# Patient Record
Sex: FEMALE | Race: WHITE
[De-identification: names, ages, dates, MRNs, and addresses within clinical notes are randomized per-mention and may not be internally consistent; named-entity substitution may affect disease eponyms.]

---

## 2018-01-31 NOTE — OR
DATE OF OPERATION:  01/30/2018

 

SURGEON:  Herb Rodriguez MD

 

PREOPERATIVE DIAGNOSIS:  Cataract left eye.

 

POSTOPERATIVE DIAGNOSIS:  Cataract left eye.

 

OPERATION PERFORMED:  Phacoemulsification of cataract left eye with placement of

an Abbott model, ZCB00, 22.5 diopter, foldable, posterior chamber intraocular

lens.

 

ASSISTANT:  None.

 

DESCRIPTION OF PROCEDURE:  Peribulbar anesthetic was performed using a mixture

of 2% lidocaine with Wydase.  The patient was prepped and draped in the usual

fashion.  A 3 mm fornix based conjunctival flap was performed at the 12 o'clock

position.  Hemostasis was obtained using diathermy, and a 2.8 mm grooved near

clear corneal incision was then made.  A stab incision was made into the

anterior chamber at the 3 o'clock position and a second stab wound incision was

made underlying the grooved near clear corneal incision.  Viscoat was instilled

into the anterior chamber, and a continuous tear capsulotomy was performed.

Hydrodissection was accomplished with balanced salt solution, and the nucleus

was removed in a divide and conquer fashion.  The remaining cortical material

was removed with the irrigation and aspiration unit.  Viscoat was instilled into

the anterior chamber, and an Abbott model, ZCB00, 22.5 diopter, foldable,

posterior chamber intraocular lens was placed into the capsular bag, the haptics

being positioned at the 3 and 9 o'clock positions.  The residual Viscoat was

removed from the anterior chamber and the anterior chamber reformed with

balanced salt solution.  The wound was checked and noted to be watertight.  The

conjunctiva was secured in its original position with diathermy.  Alphagan and

Maxitrol Ointment were then placed into the patient's eye.  The patient

tolerated the procedure well and it was without complication.  Elapsed

phacoemulsification time was 20.7 seconds.

 

Postoperative instructions as related to activities as well as medications were

reviewed with the patient.  The patient was instructed to return to see me on

the day following surgery for the first postoperative check.  The patient was

also instructed to contact me prior to that time if she were to have any

problems.

 

 

Job#: [id number]

DD: 01/30/2018 1015

DT: 01/30/2018 1614 DEG/MODL

CC:  PEGGY Romero RNC, ANP

## 2018-02-28 NOTE — OR
DATE OF OPERATION:  02/27/2018

 

SURGEON:  Herb Rodriguez MD

 

PREOPERATIVE DIAGNOSIS:  Cataract right eye.

 

POSTOPERATIVE DIAGNOSIS:  Same.

 

OPERATION PERFORMED:  Phacoemulsification of cataract right eye with placement

of an Abbott, model ZCB00, 22.5 diopter, foldable, posterior chamber intraocular

lens.

 

ASSISTANT:  None.

 

DESCRIPTION OF PROCEDURE:  Peribulbar anesthetic was performed using a mixture

of 2% lidocaine with Wydase.  The patient was prepped and draped in the usual

fashion.  A 3 mm fornix based conjunctival flap was performed at the 10 o'clock

position.  Hemostasis was obtained using diathermy, and a 2.8 mm grooved near

clear corneal incision was then made.  A stab incision was made into the

anterior chamber at the 12 o'clock position and a second stab wound incision was

made underlying the grooved near clear corneal incision.  Viscoat was instilled

into the anterior chamber, and a continuous tear capsulotomy was performed.

Hydrodissection was accomplished with balanced salt solution, and the nucleus

was removed in a divide and conquer fashion.  The remaining cortical material

was removed with the irrigation and aspiration unit.  Viscoat was instilled into

the anterior chamber, and an Abbott, model ZCB00, 22.5 diopter, foldable,

posterior chamber intraocular lens was placed into the capsular bag, the haptics

being positioned at the 1 and 7 o'clock positions.  The residual Viscoat was

removed from the anterior chamber and the anterior chamber reformed with

balanced salt solution.  The wound was checked and noted to be watertight.  The

conjunctiva was secured in its original position with diathermy.  Alphagan and

Maxitrol Ointment were then placed into the patient's eye.  The patient

tolerated the procedure well and it was without complication.  Elapsed

phacoemulsification time was 20.7 seconds.

 

Postoperative instructions as related to activities as well as medications were

reviewed with the patient.  The patient was instructed to return to see me on

the day following surgery for the first postoperative check.  The patient was

also instructed to contact me prior to that time if she were to have any

problems.

 

Job#: [id number]

DD: 02/27/2018 1043

DT: 02/27/2018 1219 DEG/MODL



CC:  PEGGY DOWNS FNP MTDD

## 2019-04-04 ENCOUNTER — HOSPITAL ENCOUNTER (OUTPATIENT)
Dept: HOSPITAL 7 - FB.ED | Age: 74
Setting detail: OBSERVATION
LOS: 1 days | Discharge: HOME | End: 2019-04-05
Attending: FAMILY MEDICINE | Admitting: FAMILY MEDICINE
Payer: MEDICARE

## 2019-04-04 DIAGNOSIS — F32.9: ICD-10-CM

## 2019-04-04 DIAGNOSIS — Z79.4: ICD-10-CM

## 2019-04-04 DIAGNOSIS — I10: ICD-10-CM

## 2019-04-04 DIAGNOSIS — Z79.899: ICD-10-CM

## 2019-04-04 DIAGNOSIS — R29.810: Primary | ICD-10-CM

## 2019-04-04 DIAGNOSIS — H91.90: ICD-10-CM

## 2019-04-04 DIAGNOSIS — E11.9: ICD-10-CM

## 2019-04-04 DIAGNOSIS — E78.00: ICD-10-CM

## 2019-04-04 PROCEDURE — 85025 COMPLETE CBC W/AUTO DIFF WBC: CPT

## 2019-04-04 PROCEDURE — 99285 EMERGENCY DEPT VISIT HI MDM: CPT

## 2019-04-04 PROCEDURE — 85610 PROTHROMBIN TIME: CPT

## 2019-04-04 PROCEDURE — 70548 MR ANGIOGRAPHY NECK W/DYE: CPT

## 2019-04-04 PROCEDURE — 84484 ASSAY OF TROPONIN QUANT: CPT

## 2019-04-04 PROCEDURE — 96374 THER/PROPH/DIAG INJ IV PUSH: CPT

## 2019-04-04 PROCEDURE — 80061 LIPID PANEL: CPT

## 2019-04-04 PROCEDURE — 70551 MRI BRAIN STEM W/O DYE: CPT

## 2019-04-04 PROCEDURE — 82962 GLUCOSE BLOOD TEST: CPT

## 2019-04-04 PROCEDURE — G0378 HOSPITAL OBSERVATION PER HR: HCPCS

## 2019-04-04 PROCEDURE — 36415 COLL VENOUS BLD VENIPUNCTURE: CPT

## 2019-04-04 PROCEDURE — 93005 ELECTROCARDIOGRAM TRACING: CPT

## 2019-04-04 PROCEDURE — 70450 CT HEAD/BRAIN W/O DYE: CPT

## 2019-04-04 PROCEDURE — A9579 GAD-BASE MR CONTRAST NOS,1ML: HCPCS

## 2019-04-04 PROCEDURE — 80048 BASIC METABOLIC PNL TOTAL CA: CPT

## 2019-04-04 NOTE — EDM.PDOC
ED HPI GENERAL MEDICAL PROBLEM





- General


Stated Complaint: BLURRED VISION


Time Seen by Provider: 04/04/19 15:12


Source of Information: Reports: Patient, EMS, Family


History Limitations: Reports: No Limitations





- History of Present Illness


INITIAL COMMENTS - FREE TEXT/NARRATIVE: 





73 y.o.w.f with a H/O IDDM and hearing issues for 60 years, was serving food 

when she suddenly felt numbness at her right face, blurred vision and  slurred 

speech. All those symptoms subsided PTA. No N/V/D, no dizziness, no focal 

weakness. No other acute medical issues. / 85 Pulse 71 RR 17 Pulse ox 97% 

on RSA Temp 36.7


Onset: Today


Onset Date: 04/04/19


Onset Time: 02:30


Duration: Hour(s):


Location: Reports: Head


Severity: Mild


Improves with: Reports: Rest


Worsens with: Reports: None


Context: Reports: Other


Associated Symptoms: Reports: Other (blurred vision, word finding difficulties, 

right facial numbness)





- Related Data


 Allergies











Allergy/AdvReac Type Severity Reaction Status Date / Time


 


No Known Allergies Allergy   Verified 04/07/19 02:26











Home Meds: 


 Home Meds





FLUoxetine [PROzac] 10 mg PO DAILY PRN 01/29/18 [History]


Insulin Glarg,Human.Rec.Analog [Lantus Solostar] 35 unit SUBCUT BEDTIME 01/29/ 18 [History]


Insulin Lispro Prot/Lispro [HumaLOG Mix 75-25] 12 unit SUBCUT 0800 01/29/18 [

History]


Insulin Lispro Protamin/Lispro [Humalog Mix 75-25 Kwikpen] 8 units SUBCUT 1700 

01/29/18 [History]


L.acidoph,Paracasei, B.lactis [Probiotic] 1 each PO DAILY 01/29/18 [History]


Multivitamin [Multivitamins] 1 each PO DAILY 01/29/18 [History]


.Megachel 1 tab PO DAILY 04/04/19 [History]


Cholecalciferol (Vitamin D3) [Vitamin D] 5,000 unit PO DAILY 04/04/19 [History]


Rosuvastatin [Crestor] 10 mg PO DAILY #30 tab 04/05/19 [Rx]


hydroCHLOROthiazide [Hydrochlorothiazide] 25 mg PO DAILY #30 tablet 04/05/19 [Rx

]


cloNIDine [Catapres] 0.1 mg PO DAILY PRN #3 tab 04/06/19 [Rx]











Past Medical History


HEENT History: Reports: Cataract, Hard of Hearing, Impaired Vision


Cardiovascular History: Reports: High Cholesterol, Hypertension


Respiratory History: Reports: None


Gastrointestinal History: Reports: GERD


Genitourinary History: Reports: UTI, Recurrent


OB/GYN History: Reports: Pregnancy


Musculoskeletal History: Reports: Arthritis, Back Pain, Chronic, Fracture


Other Musculoskeletal History: FRACTURED LEFT UPPER ARM,  ARTHRITIS TO BOTH 

HANDS.  LEFT HAND TRIGGER FINGER.


Neurological History: Reports: None


Psychiatric History: Reports: Depression


Endocrine/Metabolic History: Reports: Diabetes, Type II


Hematologic History: Reports: None


Immunologic History: Reports: None


Oncologic (Cancer) History: Reports: None


Dermatologic History: Reports: None





- Infectious Disease History


Infectious Disease History: Reports: Chicken Pox, Measles, Mumps





- Past Surgical History


Head Surgeries/Procedures: Reports: None


HEENT Surgical History: Reports: Cataract Surgery, Tonsillectomy, Other (See 

Below)


Other HEENT Surgeries/Procedures: EAR SURGERY


Cardiovascular Surgical History: Reports: None


Respiratory Surgical History: Reports: None


GI Surgical History: Reports: Appendectomy, Cholecystectomy


Female  Surgical History: Reports: Cystoscopy, D&C, Other (See Below)


Other Female  Surgeries/Procedures: PROLAPSED UTERUS


Endocrine Surgical History: Reports: None


Neurological Surgical History: Reports: None


Musculoskeletal Surgical History: Reports: None


Oncologic Surgical History: Reports: None


Dermatological Surgical History: Reports: None





Social & Family History





- Family History


Family Medical History: Noncontributory





- Caffeine Use


Caffeine Use: Reports: Coffee


Other Caffeine Use: 2 CUPS A DAY





ED ROS GENERAL





- Review of Systems


Review Of Systems: See Below


Constitutional: Reports: No Symptoms


HEENT: Reports: No Symptoms


Respiratory: Reports: No Symptoms


Cardiovascular: Reports: No Symptoms


Endocrine: Reports: No Symptoms


GI/Abdominal: Reports: No Symptoms


: Reports: No Symptoms


Musculoskeletal: Reports: No Symptoms


Skin: Reports: No Symptoms


Neurological: Reports: Paresthesia (subsided PTA), Change in Speech (Subsided 

PTA)


Psychiatric: Reports: No Symptoms


Hematologic/Lymphatic: Reports: No Symptoms


Immunologic: Reports: No Symptoms





ED EXAM, NEURO





- Physical Exam


Exam: See Below


Exam Limited By: No Limitations


General Appearance: Alert, WD/WN, Mild Distress, Thin


Eye Exam: Bilateral Eye: Nystagmus (bilateralt, chronic, not new)


Ears: Normal External Exam, Other (wear hearing aids for 60 years. )


Nose: Normal Inspection, Normal Mucosa, No Blood


Throat/Mouth: Normal Inspection, Normal Lips, Normal Voice, No Airway Compromise


Head Exam: Atraumatic, Normocephalic


Neck: Normal Inspection, Supple, Non-Tender, Full Range of Motion


Respiratory/Chest: No Respiratory Distress, Lungs Clear, Normal Breath Sounds, 

Chest Non-Tender


Cardiovascular: Normal Peripheral Pulses, Regular Rate, Rhythm, No Edema, No 

Gallop, No JVD, No Murmur, No Rub


GI/Abdominal: Normal Bowel Sounds, Soft, Non-Tender, No Organomegaly, No 

Distention, No Abnormal Bruit, No Mass, Pelvis Stable


 (Female) Exam: Deferred


Rectal (Female) Exam: Deferred


Neurological: Alert, Normal Mood/Affect, Normal Dorsiflexion, CN II-XII Intact


Back Exam: Normal Inspection, Full Range of Motion


Extremities: Normal Inspection, Normal Range of Motion, Non-Tender, No Pedal 

Edema


Psychiatric: Normal Affect, Normal Mood


Skin Exam: Warm, Dry, Intact, Normal Color, No Rash





Course





- Vital Signs


Text/Narrative:: 


73 y.o.w.f with a H/O IDDM and hearing issues for 60 years, was serving food 

when she suddenly felt numbness at her right face, blurred vision and  slurred 

speech. All those symptoms subsided PTA. No N/V/D, no dizziness, no focal 

weakness. No other acute medical issues. Last BP was taken 1 year ago. / 

85 Pulse 71 RR 17 Pulse ox 97% on RSA Temp 36.7


PE: WNWD W F with IDDM, with elevated BP 


Imaging: CT head: NAD as per RAD


Labs: CBC, BMP WNL except GLC was 233


Impression: Hypertensive encephalopathy, H/O IDDM, 


Tx: labetolol


Reexam: SBP improved to 130


4.35 pm Consultation: Dr. Kuhn, Neurologist, [facility]: Hypertensive 

encephalopathy, because the symptoms do not fit to CVA/TIA vascular  

distribution; recommended admit for obs with BP control


4.55 Consultation: Dr. Sebastian, Hospitalist: ?


Plan: Admit for observation


 


Last Recorded V/S: 


 Last Vital Signs











Temp  36.7 C   04/05/19 08:23


 


Pulse  75   04/05/19 15:20


 


Resp  18   04/05/19 15:20


 


BP  130/68   04/05/19 15:20


 


Pulse Ox  95   04/05/19 15:20














- Orders/Labs/Meds


Labs: 


 Laboratory Tests











  04/04/19 04/04/19 04/04/19 Range/Units





  15:25 15:25 15:25 


 


WBC  6.0    (4.5-12.0)  X10-3/uL


 


RBC  4.57    (3.23-5.20)  x10(6)uL


 


Hgb  14.9    (11.5-15.5)  g/dL


 


Hct  44.0    (30.0-51.3)  %


 


MCV  96.4 H    (80-96)  fL


 


MCH  32.6    (27.7-33.6)  pg


 


MCHC  33.8    (32.2-35.4)  g/dL


 


RDW  12.4    (11.5-15.5)  %


 


Plt Count  319    (125-369)  X10(3)uL


 


MPV  8.0    (7.4-10.4)  fL


 


Neut % (Auto)  42.0 L    (46-82)  %


 


Lymph % (Auto)  48.1 H    (13-37)  %


 


Mono % (Auto)  6.7    (4-12)  %


 


Eos % (Auto)  3    (1.0-5.0)  %


 


Baso % (Auto)  1    (0-2)  %


 


Neut # (Auto)  2.5    (1.6-8.3)  #


 


Lymph # (Auto)  2.9    (0.6-5.0)  #


 


Mono # (Auto)  0.4    (0.0-1.3)  #


 


Eos # (Auto)  0.2    (0.0-0.8)  #


 


Baso # (Auto)  0.0    (0.0-0.2)  #


 


PT   10.3   (8.7-11.1)  


 


INR   1.06   (0.89-1.13)  


 


Sodium    140  (135-145)  mmol/L


 


Potassium    3.6  (3.5-5.3)  mmol/L


 


Chloride    103  (100-110)  mmol/L


 


Carbon Dioxide    25  (21-32)  mmol/L


 


BUN    17  (7-18)  mg/dL


 


Creatinine    0.9  (0.55-1.02)  mg/dL


 


Est Cr Clr Drug Dosing    TNP  


 


Estimated GFR (MDRD)    > 60  (>60)  


 


BUN/Creatinine Ratio    18.9  (9-20)  


 


Glucose    233 H  ()  mg/dL


 


POC Glucose     ()  mg/dL


 


Calcium    8.7  (8.6-10.2)  mg/dL


 


Troponin I     (<0.017-0.056)  ng/mL














  04/04/19 04/04/19 Range/Units





  15:25 15:43 


 


WBC    (4.5-12.0)  X10-3/uL


 


RBC    (3.23-5.20)  x10(6)uL


 


Hgb    (11.5-15.5)  g/dL


 


Hct    (30.0-51.3)  %


 


MCV    (80-96)  fL


 


MCH    (27.7-33.6)  pg


 


MCHC    (32.2-35.4)  g/dL


 


RDW    (11.5-15.5)  %


 


Plt Count    (125-369)  X10(3)uL


 


MPV    (7.4-10.4)  fL


 


Neut % (Auto)    (46-82)  %


 


Lymph % (Auto)    (13-37)  %


 


Mono % (Auto)    (4-12)  %


 


Eos % (Auto)    (1.0-5.0)  %


 


Baso % (Auto)    (0-2)  %


 


Neut # (Auto)    (1.6-8.3)  #


 


Lymph # (Auto)    (0.6-5.0)  #


 


Mono # (Auto)    (0.0-1.3)  #


 


Eos # (Auto)    (0.0-0.8)  #


 


Baso # (Auto)    (0.0-0.2)  #


 


PT    (8.7-11.1)  


 


INR    (0.89-1.13)  


 


Sodium    (135-145)  mmol/L


 


Potassium    (3.5-5.3)  mmol/L


 


Chloride    (100-110)  mmol/L


 


Carbon Dioxide    (21-32)  mmol/L


 


BUN    (7-18)  mg/dL


 


Creatinine    (0.55-1.02)  mg/dL


 


Est Cr Clr Drug Dosing    


 


Estimated GFR (MDRD)    (>60)  


 


BUN/Creatinine Ratio    (9-20)  


 


Glucose    ()  mg/dL


 


POC Glucose   205 H  ()  mg/dL


 


Calcium    (8.6-10.2)  mg/dL


 


Troponin I  0.018   (<0.017-0.056)  ng/mL











Meds: 


Medications














Discontinued Medications














Generic Name Dose Route Start Last Admin





  Trade Name Freq  PRN Reason Stop Dose Admin


 


Gadoteridol  10 ml  04/05/19 14:53  04/05/19 15:22





  Prohance  IVPUSH  04/05/19 14:54  10 ml





  ONETIME ONE   Administration





     





     





     





     


 


Labetalol HCl  20 mg  04/04/19 16:56  04/04/19 17:16





  Normodyne  IVPUSH  04/04/19 16:57  20 mg





  ONETIME STA   Administration





     





     





  Protocol   





     


 


Sodium Chloride  10 ml  04/04/19 17:15  04/04/19 16:50





  Saline Flush  FLUSH   10 ml





  ASDIRECTED PRN   Administration





  Keep Vein Open   





     





     





     














Departure





- Departure


Time of Disposition: 19:00


Disposition: Refer to Observation


Condition: Fair


Clinical Impression: 


 Hypertensive encephalopathy








- Discharge Information

## 2019-04-05 NOTE — CT
INDICATION:  Word-finding difficulties, dizzy, blurred vision, frontal headache.



CT HEAD WITHOUT CONTRAST:  Spiral 3.75 mm helical images with sagittal and 
coronal reconstructions were obtained 04/04/19 - no comparisons.  Total exam 
DLP = 1,244.99 mGy-cm.



The paranasal sinuses appear to be fairly well-aerated.  There appears to be 
virtually no aeration of mastoid air cells, which appears to be a developmental 
process, rather than pathologic.  



Mild degenerative changes are noted at the atlantoodontoid joint.  



No definite cranial abnormality was seen.  



Vertebral artery and internal carotid artery calcification is noted.



In the white matter, there is some questionable punctate decreased density, 
especially in the right frontal area, which could represent very minimal 
microvascular disease type change.



No definite shift of midline structures or ventricular abnormalities were 
identified.  



No acute bleeding site or hematoma was seen.  



Calcification is noted at the basal ganglia on the left, likely not of clinical 
significance.  



IMPRESSION:

1.  No acute intracranial abnormality.

2.  Question minimal microvascular disease.

3.  Cerebrovascular disease with arterial calcifications noted.  



Report was called to Dr. Metz at 1604 hours on 04/04/19.

[facility]RY

## 2019-04-05 NOTE — PCM.HP
H&P History of Present Illness





- General


Date of Service: 19


Admit Problem/Dx: 


 Admission Diagnosis/Problem





Admission Diagnosis/Problem      Hypertensive encephalopathy








Source of Information: Patient


History Limitations: Reports: No Limitations





- History of Present Illness


Initial Comments - Free Text/Narative: 


Sobeida came in yesterday with blurred vision, 'drooping of the face' (which 

lasted 5 min)and was admitted for possible encephalopathy. She has no 

complaints this morning. She was serving food when she felt numb ,at the right 

side of the face, and double vision. She denied headache, chest pain or 

shortness of breath. She's healthy with exception of hearing loss and type 2 

diabetes





- Related Data


Allergies/Adverse Reactions: 


 Allergies











Allergy/AdvReac Type Severity Reaction Status Date / Time


 


No Known Allergies Allergy   Verified 18 09:12











Home Medications: 


 Home Meds





FLUoxetine [PROzac] 10 mg PO DAILY PRN 18 [History]


Insulin Glarg,Human.Rec.Analog [Lantus Solostar] 35 unit SUBCUT BEDTIME  [History]


Insulin Lispro Prot/Lispro [HumaLOG Mix 75-25] 12 unit SUBCUT 0800 18 [

History]


Insulin Lispro Protamin/Lispro [Humalog Mix 75-25 Kwikpen] 8 units SUBCUT 1700 

18 [History]


L.acidoph,Paracasei, B.lactis [Probiotic] 1 each PO DAILY 18 [History]


Multivitamin [Multivitamins] 1 each PO DAILY 18 [History]


.Megachel 1 tab DAILY 19 [History]


Cholecalciferol (Vitamin D3) [Vitamin D] 5,000 unit PO DAILY 19 [History]


Rosuvastatin [Crestor] 10 mg PO DAILY #30 tab 19 [Rx]


hydroCHLOROthiazide [Hydrochlorothiazide] 25 mg PO DAILY #30 tablet 19 [Rx

]











Past Medical History


HEENT History: Reports: Cataract, Hard of Hearing, Impaired Vision


Cardiovascular History: Reports: High Cholesterol, Hypertension


Respiratory History: Reports: None


Gastrointestinal History: Reports: GERD


Genitourinary History: Reports: UTI, Recurrent


OB/GYN History: Reports: Pregnancy


Other OB/BYN History: 


Musculoskeletal History: Reports: Arthritis, Back Pain, Chronic, Fracture


Other Musculoskeletal History: FRACTURED LEFT UPPER ARM,  ARTHRITIS TO BOTH 

HANDS.  LEFT HAND TRIGGER FINGER.


Neurological History: Reports: None


Psychiatric History: Reports: Depression


Endocrine/Metabolic History: Reports: Diabetes, Type II


Hematologic History: Reports: None


Immunologic History: Reports: None


Oncologic (Cancer) History: Reports: None


Dermatologic History: Reports: None





- Infectious Disease History


Infectious Disease History: Reports: Chicken Pox, Measles, Mumps





- Past Surgical History


Head Surgeries/Procedures: Reports: None


HEENT Surgical History: Reports: Cataract Surgery, Tonsillectomy, Other (See 

Below)


Other HEENT Surgeries/Procedures: EAR SURGERY


Cardiovascular Surgical History: Reports: None


Respiratory Surgical History: Reports: None


GI Surgical History: Reports: Appendectomy, Cholecystectomy


Female  Surgical History: Reports: Cystoscopy, D&C, Other (See Below)


Other Female  Surgeries/Procedures: PROLAPSED UTERUS


Endocrine Surgical History: Reports: None


Neurological Surgical History: Reports: None


Musculoskeletal Surgical History: Reports: None


Oncologic Surgical History: Reports: None


Dermatological Surgical History: Reports: None





Social & Family History





- Family History


Family Medical History: Noncontributory





- Tobacco Use


Smoking Status *Q: Never Smoker





- Caffeine Use


Caffeine Use: Reports: Coffee


Other Caffeine Use: 2 CUPS A DAY





- Recreational Drug Use


Recreational Drug Use: No





H&P Review of Systems





- Review of Systems:


Review Of Systems: ROS reveals no pertinent complaints other than HPI.





Exam





- Exam


Exam: See Below





- Vital Signs


Vital Signs: 


 Last Vital Signs











Temp  98.1 F   19 08:23


 


Pulse  81   19 08:23


 


Resp  15   19 08:23


 


BP  119/62   19 08:23


 


Pulse Ox  91 L  19 08:23











Weight: 64.728 kg





- Exam


General: Alert, Oriented, 4


HEENT: PERRLA, Hearing Intact, Mucosa Moist & Pink, Nares Patent, Normal Nasal 

Septum, Posterior Pharynx Clear, Conjunctiva Clear, EOMI, EACs Clear, TMs Clear


Neck: Supple, Trachea Midline, 2


Lungs: Clear to Auscultation, Normal Respiratory Effort


Cardiovascular: Regular Rate, Regular Rhythm


GI/Abdominal Exam: Normal Bowel Sounds, Soft, Non-Tender, No Organomegaly, No 

Distention, No Abnormal Bruit, No Mass, Pelvis Stable


 (Female) Exam: Deferred


Rectal (Female) Exam: Deferred


Back Exam: Normal Inspection, Full Range of Motion, NT


Extremities: Normal Inspection, Normal Range of Motion, Non-Tender, No Pedal 

Edema, Normal Capillary Refill


Skin: Warm, Dry, Intact


Neurological: Cranial Nerves Intact, Reflexes Equal Bilateral


Neuro Extensive - Mental Status: Alert, Oriented x3, Normal Mood/Affect, Normal 

Cognition


Neuro Extensive - Motor, Sensory, Reflexes: CN II-XII Intact, Normal Gait, 

Normal Reflexes


Psychiatric: Alert, Normal Affect, Normal Mood





- Patient Data


Lab Results Last 24 hrs: 


 Laboratory Results - last 24 hr











  19 Range/Units





  15:25 15:25 15:25 


 


WBC  6.0    (4.5-12.0)  X10-3/uL


 


RBC  4.57    (3.23-5.20)  x10(6)uL


 


Hgb  14.9    (11.5-15.5)  g/dL


 


Hct  44.0    (30.0-51.3)  %


 


MCV  96.4 H    (80-96)  fL


 


MCH  32.6    (27.7-33.6)  pg


 


MCHC  33.8    (32.2-35.4)  g/dL


 


RDW  12.4    (11.5-15.5)  %


 


Plt Count  319    (125-369)  X10(3)uL


 


MPV  8.0    (7.4-10.4)  fL


 


Neut % (Auto)  42.0 L    (46-82)  %


 


Lymph % (Auto)  48.1 H    (13-37)  %


 


Mono % (Auto)  6.7    (4-12)  %


 


Eos % (Auto)  3    (1.0-5.0)  %


 


Baso % (Auto)  1    (0-2)  %


 


Neut # (Auto)  2.5    (1.6-8.3)  #


 


Lymph # (Auto)  2.9    (0.6-5.0)  #


 


Mono # (Auto)  0.4    (0.0-1.3)  #


 


Eos # (Auto)  0.2    (0.0-0.8)  #


 


Baso # (Auto)  0.0    (0.0-0.2)  #


 


PT   10.3   (8.7-11.1)  


 


INR   1.06   (0.89-1.13)  


 


Sodium    140  (135-145)  mmol/L


 


Potassium    3.6  (3.5-5.3)  mmol/L


 


Chloride    103  (100-110)  mmol/L


 


Carbon Dioxide    25  (21-32)  mmol/L


 


BUN    17  (7-18)  mg/dL


 


Creatinine    0.9  (0.55-1.02)  mg/dL


 


Est Cr Clr Drug Dosing    TNP  


 


Estimated GFR (MDRD)    > 60  (>60)  


 


BUN/Creatinine Ratio    18.9  (9-20)  


 


Glucose    233 H  ()  mg/dL


 


POC Glucose     ()  mg/dL


 


Calcium    8.7  (8.6-10.2)  mg/dL


 


Troponin I     (<0.017-0.056)  ng/mL


 


Triglycerides     ()  mg/dL


 


Cholesterol     ()  mg/dL


 


LDL Cholesterol Direct     ()  mg/dL


 


HDL Cholesterol     (40-75)  mg/dL


 


Cholesterol/HDL Ratio     (0-5)  














  19 Range/Units





  15:25 15:43 08:45 


 


WBC     (4.5-12.0)  X10-3/uL


 


RBC     (3.23-5.20)  x10(6)uL


 


Hgb     (11.5-15.5)  g/dL


 


Hct     (30.0-51.3)  %


 


MCV     (80-96)  fL


 


MCH     (27.7-33.6)  pg


 


MCHC     (32.2-35.4)  g/dL


 


RDW     (11.5-15.5)  %


 


Plt Count     (125-369)  X10(3)uL


 


MPV     (7.4-10.4)  fL


 


Neut % (Auto)     (46-82)  %


 


Lymph % (Auto)     (13-37)  %


 


Mono % (Auto)     (4-12)  %


 


Eos % (Auto)     (1.0-5.0)  %


 


Baso % (Auto)     (0-2)  %


 


Neut # (Auto)     (1.6-8.3)  #


 


Lymph # (Auto)     (0.6-5.0)  #


 


Mono # (Auto)     (0.0-1.3)  #


 


Eos # (Auto)     (0.0-0.8)  #


 


Baso # (Auto)     (0.0-0.2)  #


 


PT     (8.7-11.1)  


 


INR     (0.89-1.13)  


 


Sodium     (135-145)  mmol/L


 


Potassium     (3.5-5.3)  mmol/L


 


Chloride     (100-110)  mmol/L


 


Carbon Dioxide     (21-32)  mmol/L


 


BUN     (7-18)  mg/dL


 


Creatinine     (0.55-1.02)  mg/dL


 


Est Cr Clr Drug Dosing     


 


Estimated GFR (MDRD)     (>60)  


 


BUN/Creatinine Ratio     (9-20)  


 


Glucose     ()  mg/dL


 


POC Glucose   205 H   ()  mg/dL


 


Calcium     (8.6-10.2)  mg/dL


 


Troponin I  0.018    (<0.017-0.056)  ng/mL


 


Triglycerides    124  ()  mg/dL


 


Cholesterol    203 H  ()  mg/dL


 


LDL Cholesterol Direct    136 H  ()  mg/dL


 


HDL Cholesterol    38 L  (40-75)  mg/dL


 


Cholesterol/HDL Ratio    5.3 H  (0-5)  











Result Diagrams: 


 19 15:25





 19 15:25





- Problem List


(1) Drooping of mouth


SNOMED Code(s): 507138599


   ICD Code: R29.810 - FACIAL WEAKNESS   Status: Acute   Current Visit: Yes   





(2) Elevated blood pressure reading


SNOMED Code(s): 40222148


   ICD Code: R03.0 - ELEVATED BLOOD-PRESSURE READING, W/O DIAGNOSIS OF HTN   

Status: Acute   Current Visit: Yes   





(3) Diabetes type 2, controlled


SNOMED Code(s): 45263684, 573859530


   ICD Code: E11.9 - TYPE 2 DIABETES MELLITUS WITHOUT COMPLICATIONS   Status: 

Acute   Current Visit: Yes   


Qualifiers: 


   Diabetes mellitus long term insulin use: without long term use 





(4) HLD (hyperlipidemia)


SNOMED Code(s): 01037288


   ICD Code: E78.5 - HYPERLIPIDEMIA, UNSPECIFIED   Status: Acute   Current Visit

: Yes   


Qualifiers: 


   Hyperlipidemia type: unspecified   Qualified Code(s): E78.5 - Hyperlipidemia

, unspecified   


Problem List Initiated/Reviewed/Updated: Yes


Orders Last 24hrs: 


 Active Orders 24 hr











 Category Date Time Status


 


 Patient Status [ADT] Routine ADT  19 17:27 Active


 


 Cardiac Monitoring [RC] CONTINUOUS Care  19 17:29 Active


 


 EKG Documentation Completion [RC] ASDIRECTED Care  19 15:44 Active


 


 Oxygen Therapy [RC] PRN Care  19 17:27 Active


 


 Up With Assistance [RC] ASDIRECTED Care  19 17:27 Active


 


 VTE/DVT Education [RC] Per Unit Routine Care  19 17:27 Active


 


 Vital Signs [RC] 00,04,18,12,16,20 Care  19 17:27 Active


 


 Ang Neck w Cont [MR] Routine Exams  19 09:58 Ordered


 


 Brain wo Cont [MR] Routine Exams  19 09:58 Ordered


 


 Sodium Chloride 0.9% [Saline Flush] Med  19 17:15 Active





 10 ml FLUSH ASDIRECTED PRN   


 


 Peripheral IV Insertion Adult [OM.PC] Routine Oth  19 17:15 Ordered


 


 Resuscitation Status Routine Resus Stat  19 17:27 Ordered


 


 EKG 12 Lead [EK] Routine Ther  19 15:43 Ordered








 Medication Orders





Sodium Chloride (Saline Flush)  10 ml FLUSH ASDIRECTED PRN


   PRN Reason: Keep Vein Open


   Last Admin: 19 16:50  Dose: 10 ml








Assessment/Plan Comment:: 


I reviewed the CT scan -did not show any stroke. However ,with symptoms of 

double vision; I'm going to obtain an MRI and MRA. I will discharge her home 

with Crestor, aspirin 81 mg, and HCTZ She is advised to follow-up on Monday 

return to the ED with any worsening symptoms. If her blood pressure remains 

normal we can discontinued  it.

## 2019-04-06 ENCOUNTER — HOSPITAL ENCOUNTER (EMERGENCY)
Dept: HOSPITAL 7 - FB.ED | Age: 74
Discharge: HOME | End: 2019-04-06
Payer: MEDICARE

## 2019-04-06 DIAGNOSIS — I67.4: Primary | ICD-10-CM

## 2019-04-06 DIAGNOSIS — I10: ICD-10-CM

## 2019-04-06 DIAGNOSIS — E11.9: ICD-10-CM

## 2019-04-06 DIAGNOSIS — Z79.899: ICD-10-CM

## 2019-04-06 PROCEDURE — 36415 COLL VENOUS BLD VENIPUNCTURE: CPT

## 2019-04-06 PROCEDURE — 85610 PROTHROMBIN TIME: CPT

## 2019-04-06 PROCEDURE — 85025 COMPLETE CBC W/AUTO DIFF WBC: CPT

## 2019-04-06 PROCEDURE — 99283 EMERGENCY DEPT VISIT LOW MDM: CPT

## 2019-04-06 PROCEDURE — 80048 BASIC METABOLIC PNL TOTAL CA: CPT

## 2019-04-06 PROCEDURE — 96374 THER/PROPH/DIAG INJ IV PUSH: CPT

## 2022-01-10 NOTE — EDM.PDOC
[facility]   HISTORY AND PHYSICAL      Name:  Viji Vargas   Age:  65 y.o.  Sex:  female  :  1956  MRN:  [id number]   Visit Number:  [id number]  Admission Date:  2022  Date Of Service:  01/10/22  Primary Care Physician:  Emperatriz Greene MD    Chief Complaint:     Shortness of breath    History Of Presenting Illness:      65-year-old female who is a long-term resident of nursing home with history of anoxic injury status post tracheostomy, COPD, status post PEG tube placement, hypertension, contractures who was brought here for shortness of breath.  Patient is unable to provide history. History is obtained from ER attending. Patient tested positive for COVID-19 and was noted to have increased oxygen requirement. Request was made for admission    Review Of Systems:    Unable to review system given mental status    Past Medical History: Patient  has a past medical history of COPD (chronic obstructive pulmonary disease) (HCC), Hypertension, and Pneumonia.    Past Surgical History: Patient  has a past surgical history that includes Hysterectomy and tracheostomy and peg tube insertion (N/A, 3/20/2019).    Social History: Patient  reports that she has been smoking electronic cigarette and cigarettes. She has been smoking about 1.00 pack per day. She has never used smokeless tobacco. She reports previous alcohol use. She reports that she does not use drugs.    Family History: Patient's family history is not on file.    Allergies:      Patient has no known allergies.    Home Medications:    Prior to Admission Medications     Prescriptions Last Dose Informant Patient Reported? Taking?    ALPRAZolam (XANAX) 0.5 MG tablet   No No    Administer 1 tablet per G tube Every 8 (Eight) Hours.    Amino Acids-Protein Hydrolys (PRO-STAT) liquid oral liquid   No No    30 mL by Per G Tube route 2 (Two) Times a Day.    baclofen (LIORESAL) 10 MG tablet   Yes No    10 mg by Per G Tube route  ED HPI GENERAL MEDICAL PROBLEM





- General


Chief Complaint: General


Stated Complaint: POSS STROKE


Time Seen by Provider: 19 22:05


Source of Information: Reports: Patient, Family


History Limitations: Reports: Other (word finding difficulies)





- History of Present Illness


INITIAL COMMENTS - FREE TEXT/NARRATIVE: 





73 y.o.w.m with a H/O hypertensive encephalopathy came to the ed due to word 

finding difficulties. slurred speech, double vision. Her BP was 184/91 at he 

time of arrival. No trauma, no N/V/D no SOB or chest pain. The symptoms were 

the same as he had a few days ago when she was dx'd with hypertensive 

encephalopathy. No other acute medical issues. /91 RR 15 Pulse ox 98% on 

RA Pulse 77 Temp 36.8





Onset Date: 19


Onset Time: 21:00


Duration: Hour(s):, Intermittent


Location: Reports: Head


Quality: Reports: Same as Previous Episode


Improves with: Reports: Medication


Worsens with: Reports: Other (HTN)


Context: Reports: Other


Associated Symptoms: Reports: Other (double vision, fce numnessm slurred speach 

and word finding difficulties. )


Treatments PTA: Reports: Other (see below) (home meds)





- Related Data


 Allergies











Allergy/AdvReac Type Severity Reaction Status Date / Time


 


No Known Allergies Allergy   Verified 19 02:26











Home Meds: 


 Home Meds





FLUoxetine [PROzac] 10 mg PO DAILY PRN 18 [History]


Insulin Glarg,Human.Rec.Analog [Lantus Solostar] 35 unit SUBCUT BEDTIME  [History]


Insulin Lispro Prot/Lispro [HumaLOG Mix 75-25] 12 unit SUBCUT 0800 18 [

History]


Insulin Lispro Protamin/Lispro [Humalog Mix 75-25 Kwikpen] 8 units SUBCUT 1700 

18 [History]


L.acidoph,Paracasei, B.lactis [Probiotic] 1 each PO DAILY 18 [History]


Multivitamin [Multivitamins] 1 each PO DAILY 18 [History]


.Megachel 1 tab PO DAILY 19 [History]


Cholecalciferol (Vitamin D3) [Vitamin D] 5,000 unit PO DAILY 19 [History]


Rosuvastatin [Crestor] 10 mg PO DAILY #30 tab 19 [Rx]


hydroCHLOROthiazide [Hydrochlorothiazide] 25 mg PO DAILY #30 tablet 19 [Rx

]


cloNIDine [Catapres] 0.1 mg PO DAILY PRN #3 tab 19 [Rx]











Past Medical History


HEENT History: Reports: Cataract, Hard of Hearing, Impaired Vision


Cardiovascular History: Reports: High Cholesterol, Hypertension


Respiratory History: Reports: None


Gastrointestinal History: Reports: GERD


Genitourinary History: Reports: UTI, Recurrent


OB/GYN History: Reports: Pregnancy


Other OB/GYN History: 


Musculoskeletal History: Reports: Arthritis, Back Pain, Chronic, Fracture


Other Musculoskeletal History: FRACTURED LEFT UPPER ARM,  ARTHRITIS TO BOTH 

HANDS.  LEFT HAND TRIGGER FINGER.


Neurological History: Reports: None


Psychiatric History: Reports: Depression


Endocrine/Metabolic History: Reports: Diabetes, Type II


Hematologic History: Reports: None


Immunologic History: Reports: None


Oncologic (Cancer) History: Reports: None


Dermatologic History: Reports: None





- Infectious Disease History


Infectious Disease History: Reports: Chicken Pox, Measles, Mumps





- Past Surgical History


Head Surgeries/Procedures: Reports: None


HEENT Surgical History: Reports: Cataract Surgery, Tonsillectomy, Other (See 

Below)


Other HEENT Surgeries/Procedures: EAR SURGERY


Cardiovascular Surgical History: Reports: None


Respiratory Surgical History: Reports: None


GI Surgical History: Reports: Appendectomy, Cholecystectomy


Female  Surgical History: Reports: Cystoscopy, D&C, Other (See Below)


Other Female  Surgeries/Procedures: PROLAPSED UTERUS


Endocrine Surgical History: Reports: None


Neurological Surgical History: Reports: None


Musculoskeletal Surgical History: Reports: None


Oncologic Surgical History: Reports: None


Dermatological Surgical History: Reports: None





Social & Family History





- Family History


Family Medical History: Noncontributory





- Caffeine Use


Caffeine Use: Reports: Coffee


Other Caffeine Use: 2 CUPS A DAY





ED ROS GENERAL





- Review of Systems


Review Of Systems: Unable To Obtain





ED EXAM, GENERAL





- Physical Exam


Exam: See Below


Exam Limited By: Physical Impairment


General Appearance: Alert, WD/WN, Mild Distress


Eye Exam: Bilateral Eye: Normal Inspection


Ears: Normal External Exam, Normal Canal


Ear Exam: Bilateral Ear: Auricle Normal


Nose: Normal Inspection, Normal Mucosa, No Blood


Throat/Mouth: Normal Inspection, Normal Lips, Normal Voice, No Airway Compromise


Head: Atraumatic, Normocephalic


Neck: Normal Inspection, Supple, Non-Tender, Full Range of Motion


Respiratory/Chest: No Respiratory Distress, Lungs Clear, Normal Breath Sounds, 

No Accessory Muscle Use, Chest Non-Tender


Cardiovascular: Normal Peripheral Pulses, Regular Rate, Rhythm, No Edema, No 

Gallop, No JVD, No Murmur, No Rub


Peripheral Pulses: 1+: Brachial (L)


GI/Abdominal: Normal Bowel Sounds, Soft, Non-Tender, No Organomegaly, No 

Distention, No Abnormal Bruit, No Mass


 (Female) Exam: Deferred


Rectal (Female) Exam: Deferred


Back Exam: Normal Inspection, Full Range of Motion


Extremities: Normal Inspection, Normal Range of Motion, Non-Tender


Neurological: Alert, Sensory/Motor Deficit (r face double vision slurred speech)


Psychiatric: Normal Affect, Normal Mood


Skin Exam: Warm, Dry, Intact, Normal Color, No Rash


Lymphatic: No Adenopathy





EKG INTERPRETATION


EKG Date: 19


Time: 15:20


Rhythm: NSR


Rate (Beats/Min): 83


Axis: Normal


P-Wave: Present


QRS: Normal


ST-T: Normal


QT: Normal


Comparison: NA - No Prior EKG





Course





- Vital Signs


Text/Narrative:: 








73 y.o.w.m with a H/O hypertensive encephalopathy came to the ed due to word 

finding difficulties. slurred speech, double vision. Her BP was 184/91 at he 

time of arrival. No trauma, no N/V/D no SOB or chest pain. The symptoms were 

the same as he had a few days ago when she was dx'd with hypertensive 

encephalopathy. No other acute medical issues. /91 RR 15 Pulse ox 98% on 

RA Pulse 77 Temp 36.8


PE: WNWD W F with signs of hypertensive encephalopathy because her symptoms do 

not fit the vascular/nerve distributions


Labs: CR 1.5 GFR 34


Impression: Hypertensive encephalopathy


Tx: 20 mg of Labetolol


Reexam: Her symptoms subsided


Plan: D/C with instructions.


Last Recorded V/S: 


 Last Vital Signs











Temp  36.4 C   19 22:05


 


Pulse  75   19 22:15


 


Resp  16   19 23:30


 


BP  126/60   19 23:30


 


Pulse Ox  96   19 23:30














- Orders/Labs/Meds


Orders: 


 Active Orders 24 hr











 Category Date Time Status


 


 Peripheral IV Insertion Adult [OM.PC] Routine Oth  19 22:00 Ordered











Labs: 


 Laboratory Tests











  19 Range/Units





  22:25 22:25 22:25 


 


WBC  7.1    (4.5-12.0)  X10-3/uL


 


RBC  4.79    (3.23-5.20)  x10(6)uL


 


Hgb  15.7 H    (11.5-15.5)  g/dL


 


Hct  45.5    (30.0-51.3)  %


 


MCV  94.9    (80-96)  fL


 


MCH  32.7    (27.7-33.6)  pg


 


MCHC  34.5    (32.2-35.4)  g/dL


 


RDW  12.5    (11.5-15.5)  %


 


Plt Count  308    (125-369)  X10(3)uL


 


MPV  7.3 L    (7.4-10.4)  fL


 


Neut % (Auto)  31.5 L    (46-82)  %


 


Lymph % (Auto)  56.6 H    (13-37)  %


 


Mono % (Auto)  8.1    (4-12)  %


 


Eos % (Auto)  3    (1.0-5.0)  %


 


Baso % (Auto)  1    (0-2)  %


 


Neut # (Auto)  2.2    (1.6-8.3)  #


 


Lymph # (Auto)  4.0    (0.6-5.0)  #


 


Mono # (Auto)  0.6    (0.0-1.3)  #


 


Eos # (Auto)  0.2    (0.0-0.8)  #


 


Baso # (Auto)  0.1    (0.0-0.2)  #


 


PT   10.3   (8.7-11.1)  


 


INR   1.06   (0.89-1.13)  


 


Sodium    139  (135-145)  mmol/L


 


Potassium    3.7  (3.5-5.3)  mmol/L


 


Chloride    103  (100-110)  mmol/L


 


Carbon Dioxide    28  (21-32)  mmol/L


 


BUN    17  (7-18)  mg/dL


 


Creatinine    1.5 H  (0.55-1.02)  mg/dL


 


Est Cr Clr Drug Dosing    TNP  


 


Estimated GFR (MDRD)    34 L  (>60)  


 


BUN/Creatinine Ratio    11.3  (9-20)  


 


Glucose    89  D  ()  mg/dL


 


Calcium    9.5  (8.6-10.2)  mg/dL











Meds: 


Medications














Discontinued Medications














Generic Name Dose Route Start Last Admin





  Trade Name Felibertoq  PRN Reason Stop Dose Admin


 


Labetalol HCl  20 mg  19 22:13  19 22:30





  Normodyne  IVPUSH  19 22:14  20 mg





  ONETIME ONE   Administration





     





     





  Protocol   





     


 


Sodium Chloride  10 ml  19 22:49  19 22:25





  Saline Flush  FLUSH   10 ml





  ASDIRECTED PRN   Administration





  Keep Vein Open   





     





     





     














Departure





- Departure


Time of Disposition: 23:24


Disposition: Home, Self-Care 01


Condition: Good


Clinical Impression: 


 Hypertensive encephalopathy








- Discharge Information


Prescriptions: 


cloNIDine [Catapres] 0.1 mg PO DAILY PRN #3 tab


 PRN Reason: if BP is above 150/90


Instructions:  Diplopia, Managing Your Hypertension, Hypertension


Referrals: 


Baron Vaca MD [Primary Care Provider] - 


Forms:  ED Department Discharge


Additional Instructions: 


Please space your blood pressure meds out, please take clonidine if your BP is 

above 150/90. Please F/U with your PMD as scheduled, please come back if your 

symptoms get worse acutely





- My Orders


Last 24 Hours: 


My Active Orders





19 22:00


Peripheral IV Insertion Adult [OM.PC] Routine 














- Assessment/Plan


Last 24 Hours: 


My Active Orders





19 22:00


Peripheral IV Insertion Adult [OM.PC] Routine Every 8 (Eight) Hours.    bisacodyl (DULCOLAX) 10 MG suppository   No No    Insert 1 suppository into the rectum Daily As Needed for Constipation.    Patient not taking:  Reported on 3/25/2020    budesonide (PULMICORT) 0.5 MG/2ML nebulizer solution   No No    Take 2 mL by nebulization 2 (Two) Times a Day.    carvedilol (COREG) 12.5 MG tablet   Yes No    12.5 mg by Per G Tube route 2 (Two) Times a Day.    cefuroxime (CEFTIN) 500 MG tablet   No No    Take 1 tablet by mouth 2 (Two) Times a Day.    chlorhexidine (PERIDEX) 0.12 % solution   No No    Apply 15 mL to the mouth or throat Every 12 (Twelve) Hours.    Patient not taking:  Reported on 3/25/2020    doxycycline (MONODOX) 100 MG capsule   No No    Take 1 capsule by mouth 2 (Two) Times a Day.    famotidine (PEPCID) 20 MG tablet   No No    1 tablet by Per G Tube route 2 (Two) Times a Day.    Patient not taking:  Reported on 3/25/2020    hyoscyamine sulfate (ANASPAZ) 0.125 MG tablet dispersible disintegrating tablet   Yes No    125 mcg by Per G Tube route 2 (Two) Times a Day.    ipratropium-albuterol (DUO-NEB) 0.5-2.5 mg/3 ml nebulizer   No No    Take 3 mL by nebulization Every 6 (Six) Hours.    Patient taking differently:  Take 3 mL by nebulization 4 (Four) Times a Day. Takes at [id number]    Multiple Vitamins-Minerals (MULTIVITAMIN WITH IRON-MINERALS) liquid   Yes No    Take 15 mL by mouth Daily.    ondansetron ODT (ZOFRAN-ODT) 4 MG disintegrating tablet   No No    Place 1 tablet under the tongue Every 6 (Six) Hours As Needed for Nausea or Vomiting.    pantoprazole (PROTONIX) 20 MG EC tablet   No No    Take 1 tablet by mouth Daily.    predniSONE (DELTASONE) 20 MG tablet   No No    Take 1 tablet by mouth 2 (Two) Times a Day.    sucralfate (CARAFATE) 1 g tablet   No No    Take 1 tablet by mouth 4 (Four) Times a Day.        ED Medications:    Medications   sodium chloride 0.9 % bolus 1,000 mL (1,000 mL Intravenous New Bag 1/10/22 0154)   dexamethasone sodium  phosphate injection 10 mg (10 mg Intravenous Given 1/9/22 8076)     Vital Signs:  Temp:  [98.2 °F (36.8 °C)] 98.2 °F (36.8 °C)  Heart Rate:  [69-91] 75  Resp:  [24-28] 24  BP: ()/(57-67) 94/57        01/09/22  2152   Weight: 70.3 kg (155 lb)     Body mass index is 27.46 kg/m².    Physical Exam:     Most recent vital Signs: BP 94/57   Pulse 75   Temp 98.2 °F (36.8 °C) (Axillary)   Resp 24   Wt 70.3 kg (155 lb)   SpO2 96%   BMI 27.46 kg/m²     Physical Exam  Vitals and nursing note reviewed.   Constitutional:       Appearance: She is ill-appearing.   HENT:      Head:      Comments: Tracheostomy  Cardiovascular:      Rate and Rhythm: Normal rate.   Pulmonary:      Effort: Pulmonary effort is normal.   Abdominal:      General: Abdomen is flat.      Palpations: Abdomen is soft.   Musculoskeletal:         General: Deformity present.   Skin:     General: Skin is warm.      Capillary Refill: Capillary refill takes less than 2 seconds.   Neurological:      Mental Status: Mental status is at baseline.         Laboratory data:    I have reviewed the labs done in the emergency room.    Results from last 7 days   Lab Units 01/09/22  2222   SODIUM mmol/L 139   POTASSIUM mmol/L 4.4   CHLORIDE mmol/L 101   CO2 mmol/L 28.8   BUN mg/dL 14   CREATININE mg/dL 0.41*   CALCIUM mg/dL 9.4   BILIRUBIN mg/dL 0.2   ALK PHOS U/L 75   ALT (SGPT) U/L 16   AST (SGOT) U/L 13   GLUCOSE mg/dL 110*     Results from last 7 days   Lab Units 01/09/22  2222   WBC 10*3/mm3 10.30   HEMOGLOBIN g/dL 10.3*   HEMATOCRIT % 32.3*   PLATELETS 10*3/mm3 255         Results from last 7 days   Lab Units 01/09/22  2222   TROPONIN T ng/mL <0.010     Results from last 7 days   Lab Units 01/09/22  2222   PROBNP pg/mL 594.2             Results from last 7 days   Lab Units 01/09/22  2319   PH, ARTERIAL pH units 7.429   PO2 ART mm Hg 52.5*   PCO2, ARTERIAL mm Hg 48.1*   HCO3 ART mmol/L 31.9*           Invalid input(s): USDES,  BLOODU, NITRITITE, BACT, EP    Pain  Management Panel     Pain Management Panel Latest Ref Rng & Units 3/10/2019    AMPHETAMINES SCREEN, URINE Negative Negative    BARBITURATES SCREEN Negative Negative    BENZODIAZEPINE SCREEN, URINE Negative Negative    BUPRENORPHINEUR Negative Positive(A)    COCAINE SCREEN, URINE Negative Negative    METHADONE SCREEN, URINE Negative Negative    METHAMPHETAMINEUR Negative Negative            Radiology:    No radiology results for the last 3 days    Assessment:    1. COVID-19 infection  2. Acute on chronic hypoxic respiratory failure, present on admission  3. History of anoxic brain injury status post tracheostomy  4. Status post PEG tube placement    Plan:    · We will continue Decadron at 6 mg every 12 hours.  Given hypoxia will also start patient on remdesivir.  IV hydration for 10 hours.  Consult nutritionist for tube feeding.  Continue home medications including scheduled Xanax and baclofen.  Continue nebulizer treatment.  Continue Coreg          Teo Cardenas MD  01/10/22  02:20 EST    Dictated utilizing Dragon dictation.

## 2023-03-14 ENCOUNTER — HOSPITAL ENCOUNTER (INPATIENT)
Dept: HOSPITAL 7 - FB.MS | Age: 78
LOS: 19 days | Discharge: HOME HEALTH | DRG: 561 | End: 2023-04-02
Attending: FAMILY MEDICINE | Admitting: FAMILY MEDICINE
Payer: MEDICARE

## 2023-03-14 DIAGNOSIS — E78.00: ICD-10-CM

## 2023-03-14 DIAGNOSIS — I10: ICD-10-CM

## 2023-03-14 DIAGNOSIS — M19.90: ICD-10-CM

## 2023-03-14 DIAGNOSIS — S72.92XD: ICD-10-CM

## 2023-03-14 DIAGNOSIS — Z47.89: Primary | ICD-10-CM

## 2023-03-14 DIAGNOSIS — Z90.89: ICD-10-CM

## 2023-03-14 DIAGNOSIS — Z98.49: ICD-10-CM

## 2023-03-14 DIAGNOSIS — Z79.4: ICD-10-CM

## 2023-03-14 DIAGNOSIS — K21.9: ICD-10-CM

## 2023-03-14 DIAGNOSIS — Z90.49: ICD-10-CM

## 2023-03-14 DIAGNOSIS — F39: ICD-10-CM

## 2023-03-14 DIAGNOSIS — E87.6: ICD-10-CM

## 2023-03-14 DIAGNOSIS — M54.9: ICD-10-CM

## 2023-03-14 DIAGNOSIS — Z79.899: ICD-10-CM

## 2023-03-14 DIAGNOSIS — G89.29: ICD-10-CM

## 2023-03-14 DIAGNOSIS — H91.90: ICD-10-CM

## 2023-03-14 DIAGNOSIS — D64.9: ICD-10-CM

## 2023-03-14 DIAGNOSIS — E11.9: ICD-10-CM

## 2023-03-15 RX ADMIN — NYSTATIN SCH APPLIC: 100000 POWDER TOPICAL at 20:31

## 2023-03-15 RX ADMIN — Medication SCH EA: at 20:36

## 2023-03-15 RX ADMIN — INSULIN GLARGINE SCH UNITS: 100 INJECTION, SOLUTION SUBCUTANEOUS at 20:41

## 2023-03-15 RX ADMIN — INSULIN LISPRO SCH UNITS: 100 INJECTION, SOLUTION INTRAVENOUS; SUBCUTANEOUS at 18:15

## 2023-03-16 RX ADMIN — NYSTATIN SCH APPLIC: 100000 POWDER TOPICAL at 20:51

## 2023-03-16 RX ADMIN — INSULIN LISPRO SCH UNITS: 100 INJECTION, SOLUTION INTRAVENOUS; SUBCUTANEOUS at 18:07

## 2023-03-16 RX ADMIN — Medication SCH MG: at 08:10

## 2023-03-16 RX ADMIN — INSULIN LISPRO SCH UNITS: 100 INJECTION, SOLUTION INTRAVENOUS; SUBCUTANEOUS at 07:59

## 2023-03-16 RX ADMIN — Medication SCH EA: at 20:51

## 2023-03-16 RX ADMIN — INSULIN LISPRO SCH UNITS: 100 INJECTION, SOLUTION INTRAVENOUS; SUBCUTANEOUS at 11:49

## 2023-03-16 RX ADMIN — NYSTATIN SCH APPLIC: 100000 POWDER TOPICAL at 08:07

## 2023-03-16 RX ADMIN — NYSTATIN SCH APPLIC: 100000 POWDER TOPICAL at 13:05

## 2023-03-16 RX ADMIN — INSULIN GLARGINE SCH UNITS: 100 INJECTION, SOLUTION SUBCUTANEOUS at 20:52

## 2023-03-16 RX ADMIN — Medication SCH EACH: at 08:03

## 2023-03-17 RX ADMIN — NYSTATIN SCH APPLIC: 100000 POWDER TOPICAL at 20:03

## 2023-03-17 RX ADMIN — NYSTATIN SCH APPLIC: 100000 POWDER TOPICAL at 15:39

## 2023-03-17 RX ADMIN — INSULIN LISPRO SCH UNITS: 100 INJECTION, SOLUTION INTRAVENOUS; SUBCUTANEOUS at 18:10

## 2023-03-17 RX ADMIN — INSULIN LISPRO SCH UNITS: 100 INJECTION, SOLUTION INTRAVENOUS; SUBCUTANEOUS at 08:17

## 2023-03-17 RX ADMIN — INSULIN GLARGINE SCH UNITS: 100 INJECTION, SOLUTION SUBCUTANEOUS at 20:10

## 2023-03-17 RX ADMIN — Medication SCH MG: at 08:25

## 2023-03-17 RX ADMIN — Medication SCH EA: at 20:04

## 2023-03-17 RX ADMIN — NYSTATIN SCH APPLIC: 100000 POWDER TOPICAL at 08:19

## 2023-03-17 RX ADMIN — Medication SCH EACH: at 08:23

## 2023-03-17 RX ADMIN — INSULIN LISPRO SCH UNITS: 100 INJECTION, SOLUTION INTRAVENOUS; SUBCUTANEOUS at 11:55

## 2023-03-18 RX ADMIN — INSULIN LISPRO SCH UNITS: 100 INJECTION, SOLUTION INTRAVENOUS; SUBCUTANEOUS at 12:04

## 2023-03-18 RX ADMIN — INSULIN GLARGINE SCH UNITS: 100 INJECTION, SOLUTION SUBCUTANEOUS at 20:46

## 2023-03-18 RX ADMIN — Medication SCH EA: at 20:44

## 2023-03-18 RX ADMIN — NYSTATIN SCH APPLIC: 100000 POWDER TOPICAL at 13:16

## 2023-03-18 RX ADMIN — INSULIN LISPRO SCH UNITS: 100 INJECTION, SOLUTION INTRAVENOUS; SUBCUTANEOUS at 18:00

## 2023-03-18 RX ADMIN — NYSTATIN SCH APPLIC: 100000 POWDER TOPICAL at 20:43

## 2023-03-18 RX ADMIN — Medication SCH MG: at 08:38

## 2023-03-18 RX ADMIN — INSULIN LISPRO SCH UNITS: 100 INJECTION, SOLUTION INTRAVENOUS; SUBCUTANEOUS at 08:40

## 2023-03-18 RX ADMIN — NYSTATIN SCH APPLIC: 100000 POWDER TOPICAL at 08:42

## 2023-03-18 RX ADMIN — Medication SCH EACH: at 08:42

## 2023-03-19 RX ADMIN — INSULIN LISPRO SCH UNITS: 100 INJECTION, SOLUTION INTRAVENOUS; SUBCUTANEOUS at 17:56

## 2023-03-19 RX ADMIN — NYSTATIN SCH APPLIC: 100000 POWDER TOPICAL at 21:23

## 2023-03-19 RX ADMIN — Medication SCH EA: at 21:22

## 2023-03-19 RX ADMIN — INSULIN LISPRO SCH UNITS: 100 INJECTION, SOLUTION INTRAVENOUS; SUBCUTANEOUS at 08:09

## 2023-03-19 RX ADMIN — NYSTATIN SCH APPLIC: 100000 POWDER TOPICAL at 08:05

## 2023-03-19 RX ADMIN — INSULIN GLARGINE SCH: 100 INJECTION, SOLUTION SUBCUTANEOUS at 21:34

## 2023-03-19 RX ADMIN — Medication SCH EACH: at 08:05

## 2023-03-19 RX ADMIN — INSULIN LISPRO SCH UNITS: 100 INJECTION, SOLUTION INTRAVENOUS; SUBCUTANEOUS at 12:05

## 2023-03-19 RX ADMIN — Medication SCH MG: at 08:08

## 2023-03-19 RX ADMIN — NYSTATIN SCH APPLIC: 100000 POWDER TOPICAL at 15:00

## 2023-03-20 RX ADMIN — INSULIN GLARGINE SCH UNITS: 100 INJECTION, SOLUTION SUBCUTANEOUS at 20:01

## 2023-03-20 RX ADMIN — Medication SCH MG: at 08:41

## 2023-03-20 RX ADMIN — Medication SCH EA: at 20:09

## 2023-03-20 RX ADMIN — INSULIN LISPRO SCH UNITS: 100 INJECTION, SOLUTION INTRAVENOUS; SUBCUTANEOUS at 17:33

## 2023-03-20 RX ADMIN — NYSTATIN SCH APPLIC: 100000 POWDER TOPICAL at 08:40

## 2023-03-20 RX ADMIN — NYSTATIN SCH APPLIC: 100000 POWDER TOPICAL at 14:06

## 2023-03-20 RX ADMIN — NYSTATIN SCH APPLIC: 100000 POWDER TOPICAL at 19:59

## 2023-03-20 RX ADMIN — INSULIN LISPRO SCH: 100 INJECTION, SOLUTION INTRAVENOUS; SUBCUTANEOUS at 16:07

## 2023-03-20 RX ADMIN — Medication SCH EACH: at 08:40

## 2023-03-20 RX ADMIN — INSULIN LISPRO SCH UNITS: 100 INJECTION, SOLUTION INTRAVENOUS; SUBCUTANEOUS at 11:56

## 2023-03-21 RX ADMIN — INSULIN GLARGINE SCH UNITS: 100 INJECTION, SOLUTION SUBCUTANEOUS at 20:42

## 2023-03-21 RX ADMIN — NYSTATIN SCH APPLIC: 100000 POWDER TOPICAL at 13:33

## 2023-03-21 RX ADMIN — NYSTATIN SCH APPLIC: 100000 POWDER TOPICAL at 20:39

## 2023-03-21 RX ADMIN — INSULIN LISPRO SCH UNITS: 100 INJECTION, SOLUTION INTRAVENOUS; SUBCUTANEOUS at 17:48

## 2023-03-21 RX ADMIN — INSULIN LISPRO SCH UNITS: 100 INJECTION, SOLUTION INTRAVENOUS; SUBCUTANEOUS at 13:24

## 2023-03-21 RX ADMIN — NYSTATIN SCH APPLIC: 100000 POWDER TOPICAL at 08:41

## 2023-03-21 RX ADMIN — INSULIN LISPRO SCH UNITS: 100 INJECTION, SOLUTION INTRAVENOUS; SUBCUTANEOUS at 08:35

## 2023-03-21 RX ADMIN — Medication SCH EA: at 20:41

## 2023-03-21 RX ADMIN — Medication SCH MG: at 08:41

## 2023-03-21 RX ADMIN — Medication SCH EACH: at 08:36

## 2023-03-22 RX ADMIN — INSULIN LISPRO SCH UNITS: 100 INJECTION, SOLUTION INTRAVENOUS; SUBCUTANEOUS at 17:54

## 2023-03-22 RX ADMIN — INSULIN GLARGINE SCH UNITS: 100 INJECTION, SOLUTION SUBCUTANEOUS at 20:49

## 2023-03-22 RX ADMIN — NYSTATIN SCH APPLIC: 100000 POWDER TOPICAL at 08:47

## 2023-03-22 RX ADMIN — Medication SCH EA: at 20:49

## 2023-03-22 RX ADMIN — NYSTATIN SCH APPLIC: 100000 POWDER TOPICAL at 20:49

## 2023-03-22 RX ADMIN — NYSTATIN SCH APPLIC: 100000 POWDER TOPICAL at 15:00

## 2023-03-22 RX ADMIN — INSULIN LISPRO SCH: 100 INJECTION, SOLUTION INTRAVENOUS; SUBCUTANEOUS at 08:10

## 2023-03-22 RX ADMIN — INSULIN LISPRO SCH UNITS: 100 INJECTION, SOLUTION INTRAVENOUS; SUBCUTANEOUS at 11:57

## 2023-03-22 RX ADMIN — Medication SCH MG: at 08:47

## 2023-03-22 RX ADMIN — Medication SCH EACH: at 08:47

## 2023-03-23 RX ADMIN — Medication SCH EA: at 20:23

## 2023-03-23 RX ADMIN — INSULIN LISPRO SCH: 100 INJECTION, SOLUTION INTRAVENOUS; SUBCUTANEOUS at 13:37

## 2023-03-23 RX ADMIN — NYSTATIN SCH APPLIC: 100000 POWDER TOPICAL at 08:37

## 2023-03-23 RX ADMIN — INSULIN LISPRO SCH UNITS: 100 INJECTION, SOLUTION INTRAVENOUS; SUBCUTANEOUS at 08:22

## 2023-03-23 RX ADMIN — INSULIN LISPRO SCH UNITS: 100 INJECTION, SOLUTION INTRAVENOUS; SUBCUTANEOUS at 18:12

## 2023-03-23 RX ADMIN — Medication SCH EACH: at 08:27

## 2023-03-23 RX ADMIN — Medication SCH MG: at 08:29

## 2023-03-23 RX ADMIN — INSULIN GLARGINE SCH UNITS: 100 INJECTION, SOLUTION SUBCUTANEOUS at 20:23

## 2023-03-23 RX ADMIN — NYSTATIN SCH APPLIC: 100000 POWDER TOPICAL at 14:23

## 2023-03-23 RX ADMIN — NYSTATIN SCH APPLIC: 100000 POWDER TOPICAL at 20:22

## 2023-03-24 RX ADMIN — INSULIN GLARGINE SCH UNITS: 100 INJECTION, SOLUTION SUBCUTANEOUS at 20:35

## 2023-03-24 RX ADMIN — INSULIN LISPRO SCH UNITS: 100 INJECTION, SOLUTION INTRAVENOUS; SUBCUTANEOUS at 08:36

## 2023-03-24 RX ADMIN — Medication SCH EA: at 22:28

## 2023-03-24 RX ADMIN — INSULIN LISPRO SCH UNITS: 100 INJECTION, SOLUTION INTRAVENOUS; SUBCUTANEOUS at 12:00

## 2023-03-24 RX ADMIN — Medication SCH MG: at 08:33

## 2023-03-24 RX ADMIN — NYSTATIN SCH APPLIC: 100000 POWDER TOPICAL at 13:24

## 2023-03-24 RX ADMIN — Medication SCH EACH: at 08:32

## 2023-03-24 RX ADMIN — NYSTATIN SCH APPLIC: 100000 POWDER TOPICAL at 20:33

## 2023-03-24 RX ADMIN — INSULIN LISPRO SCH UNITS: 100 INJECTION, SOLUTION INTRAVENOUS; SUBCUTANEOUS at 17:30

## 2023-03-24 RX ADMIN — NYSTATIN SCH APPLIC: 100000 POWDER TOPICAL at 08:34

## 2023-03-25 RX ADMIN — Medication SCH EA: at 20:35

## 2023-03-25 RX ADMIN — INSULIN LISPRO SCH UNITS: 100 INJECTION, SOLUTION INTRAVENOUS; SUBCUTANEOUS at 12:45

## 2023-03-25 RX ADMIN — Medication SCH MG: at 08:42

## 2023-03-25 RX ADMIN — NYSTATIN SCH APPLIC: 100000 POWDER TOPICAL at 08:40

## 2023-03-25 RX ADMIN — INSULIN GLARGINE SCH UNITS: 100 INJECTION, SOLUTION SUBCUTANEOUS at 20:35

## 2023-03-25 RX ADMIN — INSULIN LISPRO SCH: 100 INJECTION, SOLUTION INTRAVENOUS; SUBCUTANEOUS at 08:42

## 2023-03-25 RX ADMIN — NYSTATIN SCH APPLIC: 100000 POWDER TOPICAL at 20:34

## 2023-03-25 RX ADMIN — Medication SCH EACH: at 08:41

## 2023-03-25 RX ADMIN — INSULIN LISPRO SCH UNITS: 100 INJECTION, SOLUTION INTRAVENOUS; SUBCUTANEOUS at 17:57

## 2023-03-25 RX ADMIN — NYSTATIN SCH: 100000 POWDER TOPICAL at 14:54

## 2023-03-26 RX ADMIN — Medication SCH MG: at 08:30

## 2023-03-26 RX ADMIN — NYSTATIN SCH APPLIC: 100000 POWDER TOPICAL at 16:54

## 2023-03-26 RX ADMIN — INSULIN LISPRO SCH: 100 INJECTION, SOLUTION INTRAVENOUS; SUBCUTANEOUS at 12:01

## 2023-03-26 RX ADMIN — NYSTATIN SCH APPLIC: 100000 POWDER TOPICAL at 21:05

## 2023-03-26 RX ADMIN — INSULIN LISPRO SCH: 100 INJECTION, SOLUTION INTRAVENOUS; SUBCUTANEOUS at 17:28

## 2023-03-26 RX ADMIN — INSULIN LISPRO SCH: 100 INJECTION, SOLUTION INTRAVENOUS; SUBCUTANEOUS at 08:30

## 2023-03-26 RX ADMIN — INSULIN GLARGINE SCH UNITS: 100 INJECTION, SOLUTION SUBCUTANEOUS at 21:04

## 2023-03-26 RX ADMIN — NYSTATIN SCH APPLIC: 100000 POWDER TOPICAL at 08:31

## 2023-03-26 RX ADMIN — Medication SCH EACH: at 08:30

## 2023-03-26 RX ADMIN — Medication SCH EA: at 21:06

## 2023-03-27 RX ADMIN — NYSTATIN SCH APPLIC: 100000 POWDER TOPICAL at 20:36

## 2023-03-27 RX ADMIN — INSULIN LISPRO SCH: 100 INJECTION, SOLUTION INTRAVENOUS; SUBCUTANEOUS at 08:32

## 2023-03-27 RX ADMIN — Medication SCH EA: at 20:36

## 2023-03-27 RX ADMIN — Medication SCH MG: at 08:44

## 2023-03-27 RX ADMIN — INSULIN LISPRO SCH: 100 INJECTION, SOLUTION INTRAVENOUS; SUBCUTANEOUS at 12:53

## 2023-03-27 RX ADMIN — Medication SCH EACH: at 08:35

## 2023-03-27 RX ADMIN — NYSTATIN SCH APPLIC: 100000 POWDER TOPICAL at 08:35

## 2023-03-27 RX ADMIN — NYSTATIN SCH APPLIC: 100000 POWDER TOPICAL at 14:11

## 2023-03-27 RX ADMIN — INSULIN LISPRO SCH UNITS: 100 INJECTION, SOLUTION INTRAVENOUS; SUBCUTANEOUS at 18:00

## 2023-03-27 RX ADMIN — INSULIN GLARGINE SCH UNITS: 100 INJECTION, SOLUTION SUBCUTANEOUS at 20:35

## 2023-03-28 RX ADMIN — NYSTATIN SCH APPLIC: 100000 POWDER TOPICAL at 20:55

## 2023-03-28 RX ADMIN — INSULIN GLARGINE SCH UNITS: 100 INJECTION, SOLUTION SUBCUTANEOUS at 20:53

## 2023-03-28 RX ADMIN — Medication SCH EACH: at 09:45

## 2023-03-28 RX ADMIN — Medication SCH EA: at 20:55

## 2023-03-28 RX ADMIN — INSULIN LISPRO SCH: 100 INJECTION, SOLUTION INTRAVENOUS; SUBCUTANEOUS at 12:08

## 2023-03-28 RX ADMIN — INSULIN LISPRO SCH: 100 INJECTION, SOLUTION INTRAVENOUS; SUBCUTANEOUS at 08:02

## 2023-03-28 RX ADMIN — NYSTATIN SCH APPLIC: 100000 POWDER TOPICAL at 14:47

## 2023-03-28 RX ADMIN — NYSTATIN SCH APPLIC: 100000 POWDER TOPICAL at 09:30

## 2023-03-28 RX ADMIN — Medication SCH MG: at 08:05

## 2023-03-28 RX ADMIN — INSULIN LISPRO SCH: 100 INJECTION, SOLUTION INTRAVENOUS; SUBCUTANEOUS at 18:13

## 2023-03-29 RX ADMIN — Medication SCH EA: at 20:18

## 2023-03-29 RX ADMIN — INSULIN LISPRO SCH UNITS: 100 INJECTION, SOLUTION INTRAVENOUS; SUBCUTANEOUS at 17:55

## 2023-03-29 RX ADMIN — INSULIN GLARGINE SCH UNITS: 100 INJECTION, SOLUTION SUBCUTANEOUS at 20:21

## 2023-03-29 RX ADMIN — NYSTATIN SCH APPLIC: 100000 POWDER TOPICAL at 08:09

## 2023-03-29 RX ADMIN — INSULIN LISPRO SCH: 100 INJECTION, SOLUTION INTRAVENOUS; SUBCUTANEOUS at 12:41

## 2023-03-29 RX ADMIN — NYSTATIN SCH APPLIC: 100000 POWDER TOPICAL at 20:27

## 2023-03-29 RX ADMIN — INSULIN LISPRO SCH: 100 INJECTION, SOLUTION INTRAVENOUS; SUBCUTANEOUS at 08:17

## 2023-03-29 RX ADMIN — NYSTATIN SCH: 100000 POWDER TOPICAL at 20:17

## 2023-03-29 RX ADMIN — Medication SCH EACH: at 08:08

## 2023-03-29 RX ADMIN — Medication SCH MG: at 08:07

## 2023-03-29 RX ADMIN — NYSTATIN SCH: 100000 POWDER TOPICAL at 14:52

## 2023-03-30 RX ADMIN — INSULIN LISPRO SCH UNITS: 100 INJECTION, SOLUTION INTRAVENOUS; SUBCUTANEOUS at 08:17

## 2023-03-30 RX ADMIN — Medication SCH EA: at 21:35

## 2023-03-30 RX ADMIN — NYSTATIN SCH: 100000 POWDER TOPICAL at 15:15

## 2023-03-30 RX ADMIN — Medication SCH MG: at 08:15

## 2023-03-30 RX ADMIN — INSULIN LISPRO SCH: 100 INJECTION, SOLUTION INTRAVENOUS; SUBCUTANEOUS at 17:52

## 2023-03-30 RX ADMIN — NYSTATIN SCH APPLIC: 100000 POWDER TOPICAL at 08:16

## 2023-03-30 RX ADMIN — NYSTATIN SCH APPLIC: 100000 POWDER TOPICAL at 21:34

## 2023-03-30 RX ADMIN — INSULIN LISPRO SCH UNITS: 100 INJECTION, SOLUTION INTRAVENOUS; SUBCUTANEOUS at 12:06

## 2023-03-30 RX ADMIN — Medication SCH EACH: at 08:16

## 2023-03-30 RX ADMIN — INSULIN GLARGINE SCH UNITS: 100 INJECTION, SOLUTION SUBCUTANEOUS at 20:53

## 2023-03-31 RX ADMIN — INSULIN GLARGINE SCH UNITS: 100 INJECTION, SOLUTION SUBCUTANEOUS at 20:10

## 2023-03-31 RX ADMIN — Medication SCH MG: at 08:22

## 2023-03-31 RX ADMIN — INSULIN LISPRO SCH: 100 INJECTION, SOLUTION INTRAVENOUS; SUBCUTANEOUS at 12:29

## 2023-03-31 RX ADMIN — Medication SCH EA: at 20:09

## 2023-03-31 RX ADMIN — INSULIN LISPRO SCH: 100 INJECTION, SOLUTION INTRAVENOUS; SUBCUTANEOUS at 17:23

## 2023-03-31 RX ADMIN — INSULIN LISPRO SCH: 100 INJECTION, SOLUTION INTRAVENOUS; SUBCUTANEOUS at 08:14

## 2023-03-31 RX ADMIN — NYSTATIN SCH APPLIC: 100000 POWDER TOPICAL at 08:15

## 2023-03-31 RX ADMIN — NYSTATIN SCH APPLIC: 100000 POWDER TOPICAL at 20:09

## 2023-03-31 RX ADMIN — Medication SCH EACH: at 08:15

## 2023-03-31 RX ADMIN — NYSTATIN SCH APPLIC: 100000 POWDER TOPICAL at 15:36

## 2023-04-01 RX ADMIN — Medication SCH EA: at 20:14

## 2023-04-01 RX ADMIN — INSULIN LISPRO SCH: 100 INJECTION, SOLUTION INTRAVENOUS; SUBCUTANEOUS at 11:59

## 2023-04-01 RX ADMIN — INSULIN LISPRO SCH: 100 INJECTION, SOLUTION INTRAVENOUS; SUBCUTANEOUS at 07:57

## 2023-04-01 RX ADMIN — NYSTATIN SCH APPLIC: 100000 POWDER TOPICAL at 13:36

## 2023-04-01 RX ADMIN — INSULIN LISPRO SCH: 100 INJECTION, SOLUTION INTRAVENOUS; SUBCUTANEOUS at 17:32

## 2023-04-01 RX ADMIN — INSULIN GLARGINE SCH UNITS: 100 INJECTION, SOLUTION SUBCUTANEOUS at 20:14

## 2023-04-01 RX ADMIN — Medication SCH MG: at 08:02

## 2023-04-01 RX ADMIN — NYSTATIN SCH APPLIC: 100000 POWDER TOPICAL at 08:00

## 2023-04-01 RX ADMIN — NYSTATIN SCH APPLIC: 100000 POWDER TOPICAL at 20:13

## 2023-04-01 RX ADMIN — Medication SCH EACH: at 08:00

## 2023-04-02 RX ADMIN — Medication SCH EACH: at 08:54

## 2023-04-02 RX ADMIN — INSULIN LISPRO SCH UNITS: 100 INJECTION, SOLUTION INTRAVENOUS; SUBCUTANEOUS at 12:09

## 2023-04-02 RX ADMIN — Medication SCH MG: at 09:00

## 2023-04-02 RX ADMIN — NYSTATIN SCH APPLIC: 100000 POWDER TOPICAL at 08:53

## 2023-04-02 RX ADMIN — INSULIN LISPRO SCH: 100 INJECTION, SOLUTION INTRAVENOUS; SUBCUTANEOUS at 08:53
